# Patient Record
Sex: MALE | Race: WHITE | Employment: FULL TIME | URBAN - METROPOLITAN AREA
[De-identification: names, ages, dates, MRNs, and addresses within clinical notes are randomized per-mention and may not be internally consistent; named-entity substitution may affect disease eponyms.]

---

## 2022-08-11 ENCOUNTER — HOSPITAL ENCOUNTER (EMERGENCY)
Age: 47
Discharge: HOME | End: 2022-08-11
Attending: EMERGENCY MEDICINE
Payer: COMMERCIAL

## 2022-08-11 ENCOUNTER — APPOINTMENT (OUTPATIENT)
Dept: GENERAL RADIOLOGY | Age: 47
End: 2022-08-11
Payer: COMMERCIAL

## 2022-08-11 VITALS
OXYGEN SATURATION: 98 % | TEMPERATURE: 97.4 F | BODY MASS INDEX: 23.95 KG/M2 | RESPIRATION RATE: 16 BRPM | WEIGHT: 176.81 LBS | SYSTOLIC BLOOD PRESSURE: 133 MMHG | DIASTOLIC BLOOD PRESSURE: 84 MMHG | HEART RATE: 89 BPM | HEIGHT: 72 IN

## 2022-08-11 DIAGNOSIS — M25.561 ACUTE PAIN OF RIGHT KNEE: Primary | ICD-10-CM

## 2022-08-11 PROCEDURE — 73610 X-RAY EXAM OF ANKLE: CPT

## 2022-08-11 PROCEDURE — 99283 EMERGENCY DEPT VISIT LOW MDM: CPT

## 2022-08-11 PROCEDURE — 73562 X-RAY EXAM OF KNEE 3: CPT

## 2022-08-11 RX ORDER — IBUPROFEN 400 MG/1
400 TABLET ORAL EVERY 6 HOURS PRN
Qty: 30 TABLET | Refills: 0 | Status: SHIPPED | OUTPATIENT
Start: 2022-08-11

## 2022-08-11 RX ORDER — AMLODIPINE BESYLATE 2.5 MG/1
2.5 TABLET ORAL DAILY
COMMUNITY
Start: 2022-06-14

## 2022-08-11 ASSESSMENT — PAIN DESCRIPTION - LOCATION
LOCATION: KNEE;ANKLE
LOCATION: ANKLE;KNEE

## 2022-08-11 ASSESSMENT — PAIN DESCRIPTION - FREQUENCY
FREQUENCY: CONTINUOUS
FREQUENCY: CONTINUOUS

## 2022-08-11 ASSESSMENT — PAIN - FUNCTIONAL ASSESSMENT
PAIN_FUNCTIONAL_ASSESSMENT: 0-10
PAIN_FUNCTIONAL_ASSESSMENT: 0-10

## 2022-08-11 ASSESSMENT — PAIN DESCRIPTION - ORIENTATION
ORIENTATION: RIGHT
ORIENTATION: RIGHT

## 2022-08-11 ASSESSMENT — PAIN DESCRIPTION - DESCRIPTORS
DESCRIPTORS: ACHING
DESCRIPTORS: ACHING

## 2022-08-11 ASSESSMENT — PAIN SCALES - GENERAL
PAINLEVEL_OUTOF10: 7
PAINLEVEL_OUTOF10: 4

## 2022-08-11 ASSESSMENT — PAIN DESCRIPTION - PAIN TYPE: TYPE: ACUTE PAIN

## 2022-08-11 NOTE — ED PROVIDER NOTES
CHIEF COMPLAINT  Chief Complaint   Patient presents with    Knee Injury    Ankle Injury     Stepped in a hole and twisted right knee and ankle. (Worker's comp)       HISTORY OF PRESENT ILLNESS  David Ng is a 56 y.o. male who presents to the ED complaining of knee and ankle pain after he stepped in a hole with a twist injury while at work and patient complains of pain of his right medial knee both laterally and posterior medially as well as right lateral ankle pain. Patient denies pain of the hip, quadricep, hamstring, calf, Achilles or foot. No other associated injury. Patient was able to bear weight with a straight leg but any twisting of the knee creates significant more discomfort    No other complaints, modifying factors or associated symptoms. Nursing notes reviewed. Past Medical History:   Diagnosis Date    Hypertension      Past Surgical History:   Procedure Laterality Date    PACEMAKER PLACEMENT Left      History reviewed. No pertinent family history. Social History     Socioeconomic History    Marital status: Single     Spouse name: Not on file    Number of children: Not on file    Years of education: Not on file    Highest education level: Not on file   Occupational History    Not on file   Tobacco Use    Smoking status: Every Day     Packs/day: 1.00     Types: Cigarettes    Smokeless tobacco: Never   Substance and Sexual Activity    Alcohol use: Not Currently    Drug use: Yes     Types: Marijuana Charmayne Stayasmin)    Sexual activity: Not on file   Other Topics Concern    Not on file   Social History Narrative    Not on file     Social Determinants of Health     Financial Resource Strain: Not on file   Food Insecurity: Not on file   Transportation Needs: Not on file   Physical Activity: Not on file   Stress: Not on file   Social Connections: Not on file   Intimate Partner Violence: Not on file   Housing Stability: Not on file     No current facility-administered medications for this encounter. Current Outpatient Medications   Medication Sig Dispense Refill    amLODIPine (NORVASC) 2.5 MG tablet Take 2.5 mg by mouth in the morning. ibuprofen (IBU) 400 MG tablet Take 1 tablet by mouth every 6 hours as needed for Pain 30 tablet 0     No Known Allergies    REVIEW OF SYSTEMS  Positives and pertinent negatives as per HPI. Six other systems were reviewed and are negative. Nursing notes pertaining to ROS were reviewed. PHYSICAL EXAM   /84   Pulse 89   Temp 97.4 °F (36.3 °C) (Tympanic)   Resp 16   Ht 6' (1.829 m)   Wt 176 lb 12.9 oz (80.2 kg)   SpO2 98%   BMI 23.98 kg/m²   GENERAL APPEARANCE: Awake and alert. Cooperative. No acute distress. HEAD: Normocephalic. Atraumatic. EYES: PERRL. EOM's grossly intact. No scleral icterus, injection or exudate  ENT: Mucous membranes are moist.  NECK: Supple. Normal ROM. EXTREMITIES: Right leg reveals no tenderness to palpation of the right hip, quadriceps or mid hamstring. Patient does have tenderness over the medial hamstring at the insertion to the knee as well as the posterior medial compartment at the joint line. There is no obvious effusion. No tenderness over the patella, patellar or quadriceps tendon. Intact extensor apparatus. Mild tenderness over the anterior medial joint line as well as over the MCL with pain with confrontational testing of the MCL but no laxity. Anterior drawer/posterior drawer/Lachman reveal no laxity. No tenderness over the LCL. Patient was unable to tolerate Lamberto testing. No tenderness over the calf, Achilles tendon. No tenderness over the medial malleolus, anterior syndesmosis, calcaneus, navicular, base of the fifth metatarsal or forefoot. Mild tenderness over the anterior lateral ligament of the right ankle but no tenderness over the distal fibula. Anterior drawer test is stable without laxity. SKIN: Warm and dry. NEUROLOGICAL: Alert and oriented.      RADIOLOGY    XR ANKLE RIGHT (MIN 3 VIEWS)   Final Result   Mild patellofemoral osteoarthritis right knee. Small joint effusion is seen   either posttraumatic or reactive. No fracture noted. No acute osseous abnormality right ankle         XR KNEE RIGHT (3 VIEWS)   Final Result   Mild patellofemoral osteoarthritis right knee. Small joint effusion is seen   either posttraumatic or reactive. No fracture noted. No acute osseous abnormality right ankle           ED COURSE/MDM  Right right anterior lateral ankle sprain: Aircast, RICE, ibuprofen. No evidence of acute fracture or dislocation. Right knee pain: Differential diagnosis includes meniscal injury versus MCL sprain without evidence of ACL/PCL injury. Patient does have a mild effusion which likely represents a mild hemarthrosis. No acute fracture or dislocation. Knee immobilizer, crutches for nonweightbearing, RICE, ibuprofen with orthopedic follow-up and follow-up with occupational health. Patient was given scripts for the following medications. I counseled patient how to take these medications. New Prescriptions    IBUPROFEN (IBU) 400 MG TABLET    Take 1 tablet by mouth every 6 hours as needed for Pain         CLINICAL IMPRESSION  1. Acute pain of right knee        Blood pressure 133/84, pulse 89, temperature 97.4 °F (36.3 °C), temperature source Tympanic, resp. rate 16, height 6' (1.829 m), weight 176 lb 12.9 oz (80.2 kg), SpO2 98 %. Follow-up with:   Toro Benitez, [address] [id number] [address]    In 1 day      [address]  [address] [facility] [place] [address] [address] [facility]  In 1 day           Jessica Vale MD  08/11/22 [id number]

## 2022-08-11 NOTE — Clinical Note
Ambreen Garces was seen and treated in our emergency department on 8/11/2022. He may return to work on 08/16/2022. May return to work when cleared by [employer] or [employer]. Light duty with crutches before that if available. If you have any questions or concerns, please don't hesitate to call.       Drea Banks MD